# Patient Record
Sex: MALE | Race: WHITE | NOT HISPANIC OR LATINO | Employment: FULL TIME | URBAN - METROPOLITAN AREA
[De-identification: names, ages, dates, MRNs, and addresses within clinical notes are randomized per-mention and may not be internally consistent; named-entity substitution may affect disease eponyms.]

---

## 2018-04-11 ENCOUNTER — APPOINTMENT (EMERGENCY)
Dept: RADIOLOGY | Facility: HOSPITAL | Age: 48
End: 2018-04-11
Attending: EMERGENCY MEDICINE
Payer: COMMERCIAL

## 2018-04-11 ENCOUNTER — HOSPITAL ENCOUNTER (OUTPATIENT)
Facility: HOSPITAL | Age: 48
Setting detail: OBSERVATION
Discharge: HOME | End: 2018-04-12
Attending: EMERGENCY MEDICINE | Admitting: HOSPITALIST
Payer: COMMERCIAL

## 2018-04-11 DIAGNOSIS — R55 NEAR SYNCOPE: Primary | ICD-10-CM

## 2018-04-11 DIAGNOSIS — R00.2 PALPITATIONS: ICD-10-CM

## 2018-04-11 PROBLEM — E78.5 HLD (HYPERLIPIDEMIA): Status: ACTIVE | Noted: 2018-04-11

## 2018-04-11 PROBLEM — F17.200 NICOTINE DEPENDENCE: Status: ACTIVE | Noted: 2018-04-11

## 2018-04-11 LAB
ANION GAP SERPL CALC-SCNC: 6 MEQ/L (ref 3–15)
BASOPHILS # BLD: 0.03 K/UL (ref 0.01–0.1)
BASOPHILS NFR BLD: 0.4 %
BUN SERPL-MCNC: 17 MG/DL (ref 8–20)
CALCIUM SERPL-MCNC: 8.6 MG/DL (ref 8.9–10.3)
CHLORIDE SERPL-SCNC: 103 MMOL/L (ref 98–109)
CO2 SERPL-SCNC: 29 MMOL/L (ref 22–32)
CREAT SERPL-MCNC: 0.9 MG/DL (ref 0.8–1.3)
DIFFERENTIAL METHOD BLD: NORMAL
EOSINOPHIL # BLD: 0.04 K/UL (ref 0.04–0.54)
EOSINOPHIL NFR BLD: 0.6 %
ERYTHROCYTE [DISTWIDTH] IN BLOOD BY AUTOMATED COUNT: 12.7 % (ref 11.6–14.4)
GFR SERPL CREATININE-BSD FRML MDRD: >60 ML/MIN/1.73M*2
GLUCOSE SERPL-MCNC: 96 MG/DL (ref 70–99)
HCT VFR BLDCO AUTO: 42.2 % (ref 40–51)
HGB BLD-MCNC: 14.5 G/DL (ref 13.7–17.5)
IMM GRANULOCYTES # BLD AUTO: 0.03 K/UL (ref 0–0.08)
IMM GRANULOCYTES NFR BLD AUTO: 0.4 %
LYMPHOCYTES # BLD: 1.41 K/UL (ref 1.2–3.5)
LYMPHOCYTES NFR BLD: 20.6 %
MAGNESIUM SERPL-MCNC: 2.1 MG/DL (ref 1.8–2.5)
MCH RBC QN AUTO: 29.5 PG (ref 28–33.2)
MCHC RBC AUTO-ENTMCNC: 34.4 G/DL (ref 32.2–36.5)
MCV RBC AUTO: 85.9 FL (ref 83–98)
MONOCYTES # BLD: 0.46 K/UL (ref 0.3–1)
MONOCYTES NFR BLD: 6.7 %
NEUTROPHILS # BLD: 4.88 K/UL (ref 1.7–7)
NEUTS SEG NFR BLD: 71.3 %
NRBC BLD-RTO: 0 %
PDW BLD AUTO: 10 FL (ref 9.4–12.4)
PLATELET # BLD AUTO: 200 K/UL (ref 150–350)
POTASSIUM SERPL-SCNC: 4 MMOL/L (ref 3.6–5.1)
RBC # BLD AUTO: 4.91 M/UL (ref 4.5–5.8)
SODIUM SERPL-SCNC: 138 MMOL/L (ref 136–144)
T4 FREE SERPL-MCNC: 0.88 NG/DL (ref 0.58–1.64)
TROPONIN I SERPL-MCNC: <0.02 NG/ML
TSH SERPL DL<=0.05 MIU/L-ACNC: 1.47 UIU/ML (ref 0.34–5.6)
WBC # BLD AUTO: 6.85 K/UL (ref 3.8–10.5)

## 2018-04-11 PROCEDURE — 25800000 HC PHARMACY IV SOLUTIONS: Performed by: PHYSICIAN ASSISTANT

## 2018-04-11 PROCEDURE — 84484 ASSAY OF TROPONIN QUANT: CPT | Performed by: PHYSICIAN ASSISTANT

## 2018-04-11 PROCEDURE — 36415 COLL VENOUS BLD VENIPUNCTURE: CPT | Performed by: PHYSICIAN ASSISTANT

## 2018-04-11 PROCEDURE — G0378 HOSPITAL OBSERVATION PER HR: HCPCS

## 2018-04-11 PROCEDURE — 83735 ASSAY OF MAGNESIUM: CPT | Performed by: HOSPITALIST

## 2018-04-11 PROCEDURE — 84443 ASSAY THYROID STIM HORMONE: CPT | Performed by: HOSPITALIST

## 2018-04-11 PROCEDURE — 85025 COMPLETE CBC W/AUTO DIFF WBC: CPT | Performed by: PHYSICIAN ASSISTANT

## 2018-04-11 PROCEDURE — 93005 ELECTROCARDIOGRAM TRACING: CPT | Performed by: PHYSICIAN ASSISTANT

## 2018-04-11 PROCEDURE — 3E0337Z INTRODUCTION OF ELECTROLYTIC AND WATER BALANCE SUBSTANCE INTO PERIPHERAL VEIN, PERCUTANEOUS APPROACH: ICD-10-PCS | Performed by: EMERGENCY MEDICINE

## 2018-04-11 PROCEDURE — 84484 ASSAY OF TROPONIN QUANT: CPT | Performed by: HOSPITALIST

## 2018-04-11 PROCEDURE — 99220 PR INITIAL OBSERVATION CARE/DAY 70 MINUTES: CPT | Performed by: HOSPITALIST

## 2018-04-11 PROCEDURE — 84439 ASSAY OF FREE THYROXINE: CPT | Performed by: HOSPITALIST

## 2018-04-11 PROCEDURE — 80048 BASIC METABOLIC PNL TOTAL CA: CPT | Performed by: PHYSICIAN ASSISTANT

## 2018-04-11 PROCEDURE — 99285 EMERGENCY DEPT VISIT HI MDM: CPT | Mod: 25

## 2018-04-11 PROCEDURE — 96361 HYDRATE IV INFUSION ADD-ON: CPT

## 2018-04-11 PROCEDURE — 71046 X-RAY EXAM CHEST 2 VIEWS: CPT

## 2018-04-11 PROCEDURE — 96360 HYDRATION IV INFUSION INIT: CPT

## 2018-04-11 RX ORDER — ACETAMINOPHEN 325 MG/1
650 TABLET ORAL EVERY 4 HOURS PRN
Status: DISCONTINUED | OUTPATIENT
Start: 2018-04-11 | End: 2018-04-12 | Stop reason: HOSPADM

## 2018-04-11 RX ORDER — DEXTROSE 40 %
15-30 GEL (GRAM) ORAL AS NEEDED
Status: DISCONTINUED | OUTPATIENT
Start: 2018-04-11 | End: 2018-04-12 | Stop reason: HOSPADM

## 2018-04-11 RX ORDER — DEXTROSE 50 % IN WATER (D50W) INTRAVENOUS SYRINGE
25 AS NEEDED
Status: DISCONTINUED | OUTPATIENT
Start: 2018-04-11 | End: 2018-04-12 | Stop reason: HOSPADM

## 2018-04-11 RX ORDER — IBUPROFEN 200 MG
16-32 TABLET ORAL AS NEEDED
Status: DISCONTINUED | OUTPATIENT
Start: 2018-04-11 | End: 2018-04-12 | Stop reason: HOSPADM

## 2018-04-11 RX ORDER — CHOLECALCIFEROL (VITAMIN D3) 25 MCG
1000 TABLET ORAL DAILY
COMMUNITY

## 2018-04-11 RX ORDER — NITROGLYCERIN 0.4 MG/1
0.4 TABLET SUBLINGUAL EVERY 5 MIN PRN
Status: DISCONTINUED | OUTPATIENT
Start: 2018-04-11 | End: 2018-04-12 | Stop reason: HOSPADM

## 2018-04-11 RX ADMIN — SODIUM CHLORIDE 1000 ML: 9 INJECTION, SOLUTION INTRAVENOUS at 17:57

## 2018-04-11 ASSESSMENT — ENCOUNTER SYMPTOMS
ACTIVITY CHANGE: 0
COUGH: 0
DIZZINESS: 0
NUMBNESS: 0
VOMITING: 0
BACK PAIN: 0
APPETITE CHANGE: 0
SHORTNESS OF BREATH: 0
ABDOMINAL DISTENTION: 0
NAUSEA: 0
ABDOMINAL PAIN: 0
CONFUSION: 0
CHEST TIGHTNESS: 0

## 2018-04-11 ASSESSMENT — COGNITIVE AND FUNCTIONAL STATUS - GENERAL
STANDING UP FROM CHAIR USING ARMS: 4 - NONE
TOILETING: 4 - NONE
DRESSING REGULAR UPPER BODY CLOTHING: 4 - NONE
DRESSING REGULAR LOWER BODY CLOTHING: 4 - NONE
WALKING IN HOSPITAL ROOM: 4 - NONE
HELP NEEDED FOR PERSONAL GROOMING: 4 - NONE
HELP NEEDED FOR BATHING: 4 - NONE
CLIMB 3 TO 5 STEPS WITH RAILING: 4 - NONE
MOVING TO AND FROM BED TO CHAIR: 4 - NONE

## 2018-04-11 NOTE — ED ATTESTATION NOTE
I have personally seen and examined the patient.  I reviewed and agree with physician assistant / nurse practitioner’s assessment and plan of care, with the following exceptions: None  My examination, assessment, and plan of care of Arnulfo Alcaraz is as follows:    48yoM w/ Hl, not on medication, smoker, with fhx of sudden unexplained death in his brother at age 41, here following episode of heart palpitations, SOB after sudden waking during quick dizzy spell.  Exam: Lungs CTAB, no W/R/C  Plan: R/o ACS, check trop, r/o arrhythmia, cont cardiac monitoring. Given family hx of event just PTA, will place in obs for serial trop, cards eval, possible ECHO.       Gertrudis Banda MD  04/11/18 6682

## 2018-04-11 NOTE — ED PROVIDER NOTES
HPI     Chief Complaint   Patient presents with   • Rapid Heart Rate   • Dizziness       Patient reports about 4:00 he laid down to try to go to sleep when he is drifting off he felt like he shifted and was falling in his bed he stood up and felt his heart pounding and his legs felt weak.  Patient reports he sat back down in the bed and then got up and walked to a sitting room.  he reports he was still having a fast pounding heartbeat his wife knows he looked pale and took his blood pressure was in the 140 systolic with a pulse in the 70s or 80s.  Patient reports the episode lasted a few seconds and resolved his concern he came to the emergency room for evaluation.    Patient denies any chest pain or pressure, he denies any headache or blurred or double vision.  Patient denies any ripping or tearing pain in his chest belly or back.  Patient denies any recent travel he has had no calf pain or swelling;     She reports his brother  at age 41 sudden death they suspect cardiac    Patient had a vasovagal syncopal episode when he is at the eye doctor and they were talking about floaters per his wife who is a physician.             Patient History     History reviewed. No pertinent past medical history.    History reviewed. No pertinent surgical history.    History reviewed. No pertinent family history.    Social History   Substance Use Topics   • Smoking status: Current Every Day Smoker     Packs/day: 1.00   • Smokeless tobacco: Never Used      Comment: trying to quit starting last night   • Alcohol use 0.6 oz/week     1 Cans of beer per week       Systems Reviewed from Nursing Triage:          Review of Systems     Review of Systems   Constitutional: Negative for activity change and appetite change.   Respiratory: Negative for cough, chest tightness and shortness of breath.    Cardiovascular: Negative for chest pain and leg swelling.   Gastrointestinal: Negative for abdominal distention, abdominal pain, nausea and  "vomiting.   Musculoskeletal: Negative for back pain.   Neurological: Negative for dizziness and numbness.   Psychiatric/Behavioral: Negative for confusion.        Physical Exam     ED Triage Vitals [04/11/18 1725]   Temp Heart Rate Resp BP SpO2   36.9 °C (98.5 °F) 78 18 120/81 98 %      Temp Source Heart Rate Source Patient Position BP Location FiO2 (%) (Set)   Tympanic -- Lying Right upper arm --                     Patient Vitals for the past 24 hrs:   BP Temp Temp src Pulse Resp SpO2 Height Weight   04/11/18 1800 - - - 78 (!) 41 99 % - -   04/11/18 1744 120/79 - - - - - - -   04/11/18 1725 120/81 36.9 °C (98.5 °F) Tympanic 78 18 98 % 1.854 m (6' 1\") 83 kg (183 lb)           Physical Exam   Constitutional: He is oriented to person, place, and time. He appears well-developed.   HENT:   Head: Normocephalic and atraumatic.   Eyes: EOM are normal. Pupils are equal, round, and reactive to light.   Neck: Neck supple.   No carotid bruits   Cardiovascular: Normal rate, regular rhythm and normal heart sounds.    Pulmonary/Chest: Effort normal and breath sounds normal.   Abdominal: Soft. Bowel sounds are normal.   Musculoskeletal: Normal range of motion.   Neurological: He is alert and oriented to person, place, and time. No cranial nerve deficit or sensory deficit. He exhibits normal muscle tone. Coordination normal.   Skin: Skin is warm.   Psychiatric: He has a normal mood and affect.   Nursing note and vitals reviewed.           Procedures    ED Course & MDM     Labs Reviewed   CBC - Normal       Result Value    WBC 6.85      RBC 4.91      Hemoglobin 14.5      Hematocrit 42.2      MCV 85.9      MCH 29.5      MCHC 34.4      RDW 12.7      Platelets 200      MPV 10.0     CBC AND DIFFERENTIAL    Narrative:     The following orders were created for panel order CBC and Differential.  Procedure                               Abnormality         Status                     ---------                               -----------        "  ------                     CBC[[id number]]                           Normal              Final result               Diff Count[[id number]]                                        Final result                 Please view results for these tests on the individual orders.   DIFF COUNT    Differential Type Auto      nRBC 0.0      Immature Granulocytes 0.4      Neutrophils 71.3      Lymphocytes 20.6      Monocytes 6.7      Eosinophils 0.6      Basophils 0.4      Immature Granulocytes, Absolute 0.03      Neutrophils, Absolute 4.88      Lymphocytes, Absolute 1.41      Monocytes, Absolute 0.46      Eosinophils, Absolute 0.04      Basophils, Absolute 0.03     BASIC METABOLIC PANEL   TROPONIN I       ECG 12 lead   ED Interpretation   EKG demonstrates sinus rhythm 73 bpm reviewed by attending      X-RAY CHEST 2 VIEWS    (Results Pending)           [facility]         ED Course as of Apr 12 1054   Wed Apr 11, 2018 1958 X-RAY CHEST 2 VIEWS [JR]      ED Course User Index  [JR] SHER Aranda         Clinical Impressions as of Apr 12 1054   Near syncope   Palpitations     Disposition:       SHER Aranda  04/12/18 1055

## 2018-04-12 VITALS
HEIGHT: 73 IN | OXYGEN SATURATION: 97 % | SYSTOLIC BLOOD PRESSURE: 118 MMHG | DIASTOLIC BLOOD PRESSURE: 72 MMHG | HEART RATE: 69 BPM | BODY MASS INDEX: 24.25 KG/M2 | TEMPERATURE: 97.7 F | RESPIRATION RATE: 18 BRPM | WEIGHT: 183 LBS

## 2018-04-12 PROBLEM — R42 DIZZINESS: Status: ACTIVE | Noted: 2018-04-11

## 2018-04-12 PROBLEM — E78.9 LIPID DISORDER: Status: ACTIVE | Noted: 2018-04-12

## 2018-04-12 LAB
ANION GAP SERPL CALC-SCNC: 4 MEQ/L (ref 3–15)
BUN SERPL-MCNC: 12 MG/DL (ref 8–20)
CALCIUM SERPL-MCNC: 8.6 MG/DL (ref 8.9–10.3)
CHLORIDE SERPL-SCNC: 107 MMOL/L (ref 98–109)
CO2 SERPL-SCNC: 28 MMOL/L (ref 22–32)
CREAT SERPL-MCNC: 0.7 MG/DL (ref 0.8–1.3)
GFR SERPL CREATININE-BSD FRML MDRD: >60 ML/MIN/1.73M*2
GLUCOSE SERPL-MCNC: 93 MG/DL (ref 70–99)
MAGNESIUM SERPL-MCNC: 2 MG/DL (ref 1.8–2.5)
POTASSIUM SERPL-SCNC: 4.1 MMOL/L (ref 3.6–5.1)
SODIUM SERPL-SCNC: 139 MMOL/L (ref 136–144)
TROPONIN I SERPL-MCNC: <0.02 NG/ML

## 2018-04-12 PROCEDURE — 80048 BASIC METABOLIC PNL TOTAL CA: CPT | Performed by: HOSPITALIST

## 2018-04-12 PROCEDURE — 83735 ASSAY OF MAGNESIUM: CPT | Performed by: HOSPITALIST

## 2018-04-12 PROCEDURE — 99217 PR OBSERVATION CARE DISCHARGE MANAGEMENT: CPT | Performed by: HOSPITALIST

## 2018-04-12 PROCEDURE — 36415 COLL VENOUS BLD VENIPUNCTURE: CPT | Performed by: HOSPITALIST

## 2018-04-12 PROCEDURE — G0378 HOSPITAL OBSERVATION PER HR: HCPCS

## 2018-04-12 NOTE — ASSESSMENT & PLAN NOTE
A: Patient presents with near syncope and sudden onset of palpitations that resolved after several seconds. Started a new type of E-cigarette today that has 2.4% nicotine in  It. Family history exists of his brother who  of sudden cardiac death at the age of 40 y/o. Troponin negative and EKG is NSR with no acute ST changes.  Sx resolved  PLAN  ? If related to the high amount of nicotine in the E-cigarette  Avoid E cigarettes.

## 2018-04-12 NOTE — SUBJECTIVE & OBJECTIVE
Admitting diagnosis: Near syncope [R55]  Patient is a 48 y.o. male with a past medical history of hyperlipidemia who presents with complaint of sudden onset of palpitations that occurred around 4 PM and woke him up from a near sleep.  Patient states that he could feel his heart racing and it lasted several seconds.  He attempted to get up from the bed and the palpitations continued.  He states that when he tried to stand he had some lightheadedness and dizziness however denies any chest pain, shortness of breath, nausea, vomiting or vision changes.  He does state that he got some cold and clammy hands and felt some paresthesias of left hand for a few seconds.  Patient states that after the episode resolved he did not have any further episodes of the palpitations. He does state that he was anxious after the palpitations as he never had any similar occurrence. He is trying to quit smoking and started to use a new brand of E-cigarette today that has 2.4% nicotine in it and he is not sure if he has used that strength in the past. Patient states that he does drink some caffeine: 1 [place] coffee and 2 cups of tea per day. He denies any intake of new medications.     Of note, the patient does have a family history of sudden cardiac death of his brother at the age of 41.     Medical History:   Past Medical History:   Diagnosis Date   • Lipid disorder     Diet controlled       Surgical History: History reviewed. No pertinent surgical history.    Social History:   Social History     Social History   • Marital status:      Spouse name: N/A   • Number of children: N/A   • Years of education: N/A     Social History Main Topics   • Smoking status: Current Every Day Smoker     Packs/day: 1.00     Years: 20.00   • Smokeless tobacco: Never Used      Comment: trying to quit starting last night   • Alcohol use 0.6 oz/week     1 Cans of beer per week      Comment: Occasional   • Drug use: No   • Sexual activity: Yes     Other  Topics Concern   • None     Social History Narrative    Patient is originally from [place].       Family History:   Family History   Problem Relation Age of Onset   • Heart disease Mother    • Heart disease Brother    • Sudden death Brother        Allergies: Patient has no known allergies.    Review of Systems  Constitutional: negative for generalized weakness, night sweats, malaise, fevers, fatigue and chills  Eyes: negative for blurred vision and visual disturbance  Ears, nose, mouth, throat, and face: negative for ear pain, ringing in the ears, nasal congestion and hoarseness  Respiratory: negative for cough, shortness of breath, dyspnea on exertion, chest pain/tightness and wheezing  Cardiovascular: positive for lightheadedness, irregular heart beat, palpitations and near syncope, negative for chest pain, chest pressure/discomfort, fatigue, orthopnea, PND and edema lower extremity  Gastrointestinal: negative for nausea, vomiting, constipation and diarrhea  Genitourinary:negative for painful urination, frequency and nocturia  Musculoskeletal:negative for generalized joint pain and muscle weakness  Neurological: positive for lightheadedness and tingling, negative for confusion, headaches, seizures, speech problems, tremors, weakness and diploplia  Behavioral/Psych: negative for visual hallucination, auditory hallucination and depression positive for anxiety    Vital Signs for the last 24 hours:  Temp:  [36.9 °C (98.5 °F)] 36.9 °C (98.5 °F)  Heart Rate:  [67-78] 67  Resp:  [18-41] 20  BP: (120)/(79-81) 120/79    General appearance: alert, appears stated age and cooperative  Head: normocephalic, without obvious abnormality, atraumatic  Eyes: conjunctivae clear. PERRL, EOM's intact.  Ears: normal TM exam and normal external ear  Nose: Nares normal. Septum midline. Mucosa normal.  Throat: normal oropharynx  Neck: no JVD, no adenopathy, no carotid bruit, supple, symmetrical, trachea midline and thyroid not enlarged,  symmetric, no tenderness/mass/nodules  Back: symmetric, no curvature. ROM normal  Lungs: clear to auscultation bilaterally  Chest wall: no tenderness  Heart: regular rate and rhythm, S1, S2 normal, no murmur, click, rub or gallop  Abdomen: soft, non-tender; bowel sounds normal; no masses, no organomegaly  Extremities: extremities normal, warm and well-perfused; no cyanosis, clubbing, or edema  Neurologic: Grossly normal    Labs  I have reviewed the patient's pertinent labs. Pertinent labs are within normal limits.    Imaging  I have independently reviewed the pertinent imaging from the last 24 hrs.  Chest Xray- no acute disease    ECG/Telemetry  I have independently reviewed the ECG. No significant findings., HR 73 bpm, NSR, no ST changes    VTE Assessment: [place] VTE Score: 0

## 2018-04-12 NOTE — ASSESSMENT & PLAN NOTE
A: Patient presents with near syncope and sudden onset of palpitations that resolved after several seconds. Started a new type of E-cigarette today that has 2.4% nicotine in  It. Family history exists of his brother who  of sudden cardiac death at the age of 42 y/o. Troponin negative and EKG is NSR with no acute ST changes.  P:  Will monitor patient closely on telemetry for any arrhythmia.   Keep K>4 and Mg>2.  Check TSH and free T4.   Trend troponins.  Check orthostatic vitals to ensure not related to orthostasis.   Cardiology consult for further recommendation. May need to consider a Holter monitor as outpatient.

## 2018-04-12 NOTE — ASSESSMENT & PLAN NOTE
A: Patient presents with near syncope and sudden onset of palpitations that resolved after several seconds. Started a new type of E-cigarette today that has 2.4% nicotine in  It. Family history exists of his brother who  of sudden cardiac death at the age of 42 y/o. Troponin negative and EKG is NSR with no acute ST changes.  P:  Will monitor patient closely on telemetry for any arrhythmia.   Keep K>4 and Mg>2.  Check TSH and free T4.   Trend troponins.  Check orthostatic vitals to ensure not related to orthostasis.   Cardiology consult for further recommendation. May need to consider a Holter monitor as outpatient.   ? If related to the high amount of nicotine in the E-cigarette.

## 2018-04-12 NOTE — ASSESSMENT & PLAN NOTE
Patient reports a brief episode of lightheadedness, most likely vagal response to palpitations and anxiety.  His symptoms have since resolved and does not have any history of dizziness.  He does have a history of vertigo which is not similar to this episode yesterday.  Patient was had negative orthostatic vital signs.  We do not feel patient needs echocardiogram however if symptoms persist or reoccur we will arrange an outpatient echocardiogram in our office.

## 2018-04-12 NOTE — ASSESSMENT & PLAN NOTE
A: Attempting cessation of cigarettes. Used an E-cigarette today,  P:  Nicotine patch as needed daily.

## 2018-04-12 NOTE — CONSULTS
Cardiology Consult Note  Subjective     Arnulfo Alcaraz is a 48 y.o. male who was admitted for Palpitations [R00.2]  Near syncope [R55]. Arnulfo Alcaraz was referred by Dr. Olguin for management recommendations. Arnulfo Alcaraz is a 48-year-old male, with remarkable past medical history of hyperlipidemia, hx vertigo with seasonal changes, who presents with complaints of brief episode of palpitation and dizziness yesterday.  Patient woke up yesterday in good health and decided to stop smoking cigarettes.  He purchased a e-cigarette from a local [employer] station.   Approximately yesterday around 3 Pm, he smoked his e-cigarette and shortly after had a brief episode of palpitations that lasted for a few seconds.  He reports that he also had brief episode of lightheadedness upon standing.  He let his wife know, a former physician in her country, who notes he appeared pale.  Pt was brought to the Ed, and has had no reoccuring sx.  No arrythmias noted on telemetry.        Past Medical History:   Diagnosis Date   • Lipid disorder     Diet controlled       History reviewed. No pertinent surgical history.    Social History     Social History Narrative    Patient is originally from [place].       Family History   Problem Relation Age of Onset   • Heart disease Mother    • Heart disease Brother    • Sudden death Brother        Patient has no known allergies.    Current Facility-Administered Medications   Medication Dose Route Frequency Provider Last Rate Last Dose   • acetaminophen (TYLENOL) tablet 650 mg  650 mg oral q4h PRLUZ Hale MD       • glucose chewable tablet 16-32 g of dextrose  16-32 g of dextrose oral PRN Fanny Hale MD        Or   • dextrose 40 % oral gel 15-30 g of dextrose  15-30 g of dextrose oral PRN Fanny Hale MD        Or   • glucagon (GLUCAGEN) injection 1 mg  1 mg intramuscular PRN Fanny Hale MD        Or   • dextrose in water injection 12.5 g  25 mL intravenous PRN Fanny Hale MD       • nitroglycerin (NITROSTAT) SL  "tablet 0.4 mg  0.4 mg sublingual q5 min PRN Fanny Hale MD           Review of Systems  Pertinent items are noted in HPI.    Objective     Physical Exam  /68 (BP Location: Right upper arm, Patient Position: Lying)   Pulse 76   Temp 36.4 °C (97.6 °F) (Oral)   Resp 18   Ht 1.854 m (6' 1\")   Wt 83 kg (183 lb)   SpO2 97%   BMI 24.14 kg/m²     The patient appears comfortable and is in no apparent distress,   Eyes: Eyelids and sclera normal,  Neck: No jugular venous distention, no thyromegaly, no lymphadenopathy, no carotid bruits,   Lungs: Clear to auscultation, normal respiratory effort,  Heart: Regular rhythm, normal S1 and S2, no murmurs rubs or gallops,  Abdomen: Soft, nontender,   Extremities: No edema, no calf tenderness or swelling, pulses intact,  Skin: No rashes,  Neuro: Alert and oriented without focal deficit,  Psych: Affect normal.          Labs   Lab Results   Component Value Date    WBC 6.85 04/11/2018    HGB 14.5 04/11/2018    HCT 42.2 04/11/2018     04/11/2018     04/12/2018    K 4.1 04/12/2018     04/12/2018    CREATININE 0.7 (L) 04/12/2018    BUN 12 04/12/2018    CO2 28 04/12/2018    TSH 1.47 04/11/2018       Imaging  I have independently reviewed the pertinent imaging from the last 24 hrs.    Cxr:      The lungs are clear.  No pleural effusion is seen.  There is no pneumothorax.  Cardiomediastinal and hilar contours are within normal limits.  No acute  thoracic bony abnormality is appreciated.   Impression:     IMPRESSION: No acute disease         ECG   sinus rhythm at 73bpm, no acute ST wave abnormalities.    Telemetry  sinus rhythm      Assessment   48 y.o. male being consulted for management recommendations  Dizziness   Assessment & Plan    Patient reports a brief episode of lightheadedness, most likely vagal response to palpitations and anxiety.  His symptoms have since resolved and does not have any history of dizziness.  He does have a history of vertigo which is " not similar to this episode yesterday.  Patient was had negative orthostatic vital signs.  We do not feel patient needs echocardiogram however if symptoms persist or reoccur we will arrange an outpatient echocardiogram in our office.        * Palpitations   Assessment & Plan    Patient had a brief episode of palpitations after trying the e-cigarette yesterday.  It lasted only seconds.  He has had no recurring symptoms, no arrhythmias noted on telemetry monitoring.  Most likely this is a response from the E cigarette which he was advised to avoid.  He is safe for discharge from the hospital from a cardiac standpoint.  If symptoms reoccur we will consider outpatient heart monitoring.          Lipid disorder   Assessment & Plan    Cont diet/exercise, followed by PCP.        Nicotine dependence   Assessment & Plan    Avoid E cigarettes, consider nicorette gum/patch.                    SHER Hess  4/12/2018

## 2018-04-12 NOTE — DISCHARGE SUMMARY
Inpatient Discharge Summary    BRIEF OVERVIEW  Admitting Provider:  H&P Notes 3/13/2018 to 4/12/2018     Date of Service Author Author Type Status Note Type File Time    04/11/18 2116 Fanny Hale MD Physician Signed H&P 04/11/18 2116          Attending Provider: Bonita Olguin MD Attending phys phone: [phone]  Primary Care Physician at Discharge: Corie Kidd [phone]    Admission Date: 4/11/2018     Discharge Date: 4/12/2018    Primary Discharge Diagnosis  Palpitations    Secondary Discharge Diagnosis  Active Hospital Problems    Diagnosis Date Noted   • Lipid disorder 04/12/2018   • Dizziness 04/11/2018   • Palpitations 04/11/2018   • HLD (hyperlipidemia) 04/11/2018   • Nicotine dependence 04/11/2018      Resolved Hospital Problems    Diagnosis Date Noted Date Resolved   No resolved problems to display.       DETAILS OF HOSPITAL STAY    Operative Procedures Performed      Consults:   Consult Notes 3/13/2018 to 4/12/2018     Date of Service Author Author Type Status Note Type File Time    04/12/18 1100 SHER Cabrera Physician Assistant Attested Consults 04/12/18 1208          Consult Orders During Admission:  IP CONSULT TO CARDIOLOGY     Procedures: none  Pertinent Test Results: telemetry without significant abnormality    Imaging  X-ray Chest 2 Views    Result Date: 4/11/2018  IMPRESSION: No acute disease          Presenting Problem/History of Present Illness  Near syncope         Exam on Day of Discharge  Patient seen and examined on day of discharge.  General:  Patient is awake, alert, was seen with wife at bedside  EXAM:  Chest   Clear       Heart RRR without murmurs  Abd soft, Nt, Bs+  Ext  No c/c/e  Neuro:  A&O x 3, calm, appropriate  Ext no edema    Hospital Course  Patient was admitted with palpitations after  Experiencing prolonged period of palpitations after using an e-cigarette yesterday (in effort to stop smoking).    He reports chest sensations were very uncomfortable, and so he  came to Ed.  The palpitations have resolved and he has no further symptoms.  He reports multiple episodes of attempting to stop smoking.    He was seen in consultation by Cardiology who recommended no further evaluation at present time.  He was given a card by Dr. Mendelson, with instructions to f/u as needed.  Discussed options for managing smoking cessation, and managing anxiety (for which he has taken lorazepam in past prn--he was not given an rx at present time).  He is stable for DC    Discharge Orders  Released Discharge Orders     Order Details Provider Status    cholecalciferol, vitamin D3, 1,000 unit tablet Take 1,000 Units by mouth daily. Bonita Olguin MD Resume at Discharge (Patient Reported)    acetaminophen (TYLENOL) tablet 650 mg 650 mg, oral, Every 4 hours PRN, pain, fever, oral temp > 100.4°F or rectal temp > 101.4°F, Starting Wed 4/11/18 at 2207, For 90 daysMay be given with NSAIDs/opioids.  Max acetaminophen 4000 mg/day Bonita Olguin MD Do Not Order at Discharge    dextrose 40 % oral gel 15-30 g of dextrose 15-30 g of dextrose, oral, As needed, low blood sugar, Blood Glucose <= 70, Starting Wed 4/11/18 at 2206, For 90 days* If unable to chew but able to take PO * Hypoglycemia (Adult)  Blood Glucose 51 mg/dL - 70 mg/dL -- Administer 15 grams of simple carbohydrates (1 tube of glucose gel) Blood Glucose <= 50 mg/dL -- Administer 30 grams of simple carbohydrates (2 tubes of glucose gel) Bonita Olguin MD Do Not Order at Discharge    dextrose in water injection 12.5 g 12.5 g (25 mL), intravenous, As needed, low blood sugar, Blood Glucose <= 70, Starting Wed 4/11/18 at 2206, For 90 days* If NPO or unable to swallow and has IV access * Bonita Olguin MD Do Not Order at Discharge    glucagon (GLUCAGEN) injection 1 mg 1 mg, intramuscular, As needed, low blood sugar, Blood Glucose <= 70, Starting Wed 4/11/18 at 2206, For 90 days* If NPO or unable to swallow and does not have IV access * Bonita MOON  MD Sharif Do Not Order at Discharge    glucose chewable tablet 16-32 g of dextrose 16-32 g of dextrose, oral, As needed, low blood sugar, Blood Glucose <= 70, Starting Wed 4/11/18 at 2206, For 90 days* If able to take PO * Hypoglycemia (Adult)  Blood Glucose 51 mg/dL - 70 mg/dL -- Administer 16 grams of simple carbohydrates (4 glucose tablets) Blood Glucose <= 50 mg/dL -- Administer 32 grams of simple carbohydrates (8 glucose tablets) Bonita Olguin MD Do Not Order at Discharge    nitroglycerin (NITROSTAT) SL tablet 0.4 mg 0.4 mg, sublingual, Every 5 min PRN, chest pain, for 3 doses, Starting Wed 4/11/18 at 2207, For 90 daysq 5 min x 3 doses PRN chest pain. STAT ECG prior to administration Please verify the patient has not taken sildenafil (VIAGRA, REVATIO); tadalafil (CIALIS, ADCIRCA); vardenafil (LEVITRA, STAXYN) or avanafil (STENDRA) in the past 48 hours before administration. Bonita Olguin MD Do Not Order at Discharge          Outpatient Follow-Ups  No future appointments.  Referrals:  No orders of the defined types were placed in this encounter.      Active Issues Requiring Follow-up  Issue: smoking cessation  What is Needed:  Stop E-cigarettes, try lower dose nicotine patch (14 mg may be low enough to avoid palpitations, and if palpitations occur, stop)      Test Results Pending at Discharge  Unresulted Labs     None              Discharge Disposition  Home   Code Status at Discharge: Full Code  Physician Order for Life-Sustaining Treatment Document Status      No documents found

## 2018-04-12 NOTE — H&P
History & Physical    Subjective/Objective:  Admitting diagnosis: Near syncope [R55]  Patient is a 48 y.o. male with a past medical history of hyperlipidemia who presents with complaint of sudden onset of palpitations that occurred around 4 PM and woke him up from a near sleep.  Patient states that he could feel his heart racing and it lasted several seconds.  He attempted to get up from the bed and the palpitations continued.  He states that when he tried to stand he had some lightheadedness and dizziness however denies any chest pain, shortness of breath, nausea, vomiting or vision changes.  He does state that he got some cold and clammy hands and felt some paresthesias of left hand for a few seconds.  Patient states that after the episode resolved he did not have any further episodes of the palpitations. He does state that he was anxious after the palpitations as he never had any similar occurrence. He is trying to quit smoking and started to use a new brand of E-cigarette today that has 2.4% nicotine in it and he is not sure if he has used that strength in the past. Patient states that he does drink some caffeine: 1 [place] coffee and 2 cups of tea per day. He denies any intake of new medications.     Of note, the patient does have a family history of sudden cardiac death of his brother at the age of 41.     Medical History:   Past Medical History:   Diagnosis Date   • Lipid disorder     Diet controlled       Surgical History: History reviewed. No pertinent surgical history.    Social History:   Social History     Social History   • Marital status:      Spouse name: N/A   • Number of children: N/A   • Years of education: N/A     Social History Main Topics   • Smoking status: Current Every Day Smoker     Packs/day: 1.00     Years: 20.00   • Smokeless tobacco: Never Used      Comment: trying to quit starting last night   • Alcohol use 0.6 oz/week     1 Cans of beer per week      Comment: Occasional   • Drug  use: No   • Sexual activity: Yes     Other Topics Concern   • None     Social History Narrative    Patient is originally from [place].       Family History:   Family History   Problem Relation Age of Onset   • Heart disease Mother    • Heart disease Brother    • Sudden death Brother        Allergies: Patient has no known allergies.    Review of Systems  Constitutional: negative for generalized weakness, night sweats, malaise, fevers, fatigue and chills  Eyes: negative for blurred vision and visual disturbance  Ears, nose, mouth, throat, and face: negative for ear pain, ringing in the ears, nasal congestion and hoarseness  Respiratory: negative for cough, shortness of breath, dyspnea on exertion, chest pain/tightness and wheezing  Cardiovascular: positive for lightheadedness, irregular heart beat, palpitations and near syncope, negative for chest pain, chest pressure/discomfort, fatigue, orthopnea, PND and edema lower extremity  Gastrointestinal: negative for nausea, vomiting, constipation and diarrhea  Genitourinary:negative for painful urination, frequency and nocturia  Musculoskeletal:negative for generalized joint pain and muscle weakness  Neurological: positive for lightheadedness and tingling, negative for confusion, headaches, seizures, speech problems, tremors, weakness and diploplia  Behavioral/Psych: negative for visual hallucination, auditory hallucination and depression positive for anxiety    Vital Signs for the last 24 hours:  Temp:  [36.9 °C (98.5 °F)] 36.9 °C (98.5 °F)  Heart Rate:  [67-78] 67  Resp:  [18-41] 20  BP: (120)/(79-81) 120/79    General appearance: alert, appears stated age and cooperative  Head: normocephalic, without obvious abnormality, atraumatic  Eyes: conjunctivae clear. PERRL, EOM's intact.  Ears: normal TM exam and normal external ear  Nose: Nares normal. Septum midline. Mucosa normal.  Throat: normal oropharynx  Neck: no JVD, no adenopathy, no carotid bruit, supple, symmetrical,  trachea midline and thyroid not enlarged, symmetric, no tenderness/mass/nodules  Back: symmetric, no curvature. ROM normal  Lungs: clear to auscultation bilaterally  Chest wall: no tenderness  Heart: regular rate and rhythm, S1, S2 normal, no murmur, click, rub or gallop  Abdomen: soft, non-tender; bowel sounds normal; no masses, no organomegaly  Extremities: extremities normal, warm and well-perfused; no cyanosis, clubbing, or edema  Neurologic: Grossly normal    Labs  I have reviewed the patient's pertinent labs. Pertinent labs are within normal limits.    Imaging  I have independently reviewed the pertinent imaging from the last 24 hrs.  Chest Xray- no acute disease    ECG/Telemetry  I have independently reviewed the ECG. No significant findings., HR 73 bpm, NSR, no ST changes    VTE Assessment: [place] VTE Score: 0          Assessment/Plan:  * Palpitations   Assessment & Plan    A: Patient presents with near syncope and sudden onset of palpitations that resolved after several seconds. Started a new type of E-cigarette today that has 2.4% nicotine in  It. Family history exists of his brother who  of sudden cardiac death at the age of 42 y/o. Troponin negative and EKG is NSR with no acute ST changes.  P:  Will monitor patient closely on telemetry for any arrhythmia.   Keep K>4 and Mg>2.  Check TSH and free T4.   Trend troponins.  Check orthostatic vitals to ensure not related to orthostasis.   Cardiology consult for further recommendation. May need to consider a Holter monitor as outpatient.         Near syncope   Assessment & Plan    A: Patient presents with near syncope and sudden onset of palpitations that resolved after several seconds. Started a new type of E-cigarette today that has 2.4% nicotine in  It. Family history exists of his brother who  of sudden cardiac death at the age of 42 y/o. Troponin negative and EKG is NSR with no acute ST changes.  P:  Will monitor patient closely on telemetry for any  arrhythmia.   Keep K>4 and Mg>2.  Check TSH and free T4.   Trend troponins.  Check orthostatic vitals to ensure not related to orthostasis.   Cardiology consult for further recommendation. May need to consider a Holter monitor as outpatient.   ? If related to the high amount of nicotine in the E-cigarette.        Nicotine dependence   Assessment & Plan    A: Attempting cessation of cigarettes. Used an E-cigarette today,  P:  Nicotine patch as needed daily.        HLD (hyperlipidemia)   Assessment & Plan    A: Not on any medications as outpatient.  P:  Check lipid panel.  Continue diet control.             Code Status: Full Code  Estimated discharge date: 4/12/2018

## 2018-04-12 NOTE — PLAN OF CARE
Problem: Patient Care Overview  Goal: Plan of Care Review  Outcome: Ongoing (interventions implemented as appropriate)   04/12/18 0833   Coping/Psychosocial   Plan Of Care Reviewed With patient   Plan of Care Review   Progress progress toward functional goals as expected   Outcome Summary Patient verbalized plan of care to see cardiologist and hospitalist today .

## 2018-04-12 NOTE — ASSESSMENT & PLAN NOTE
Patient had a brief episode of palpitations after trying the e-cigarette yesterday.  It lasted only seconds.  He has had no recurring symptoms, no arrhythmias noted on telemetry monitoring.  Most likely this is a response from the E cigarette which he was advised to avoid.  He is safe for discharge from the hospital from a cardiac standpoint.  If symptoms reoccur we will consider outpatient heart monitoring.

## 2018-04-14 LAB
ATRIAL RATE: 73
P AXIS: 57
PR INTERVAL: 126
QRS DURATION: 92
QT INTERVAL: 360
QTC CALCULATION(BAZETT): 396
R AXIS: 76
T WAVE AXIS: 35
VENTRICULAR RATE: 73

## 2022-08-31 ENCOUNTER — APPOINTMENT (RX ONLY)
Dept: URBAN - METROPOLITAN AREA CLINIC 26 | Facility: CLINIC | Age: 52
Setting detail: DERMATOLOGY
End: 2022-08-31

## 2022-08-31 DIAGNOSIS — L82.1 OTHER SEBORRHEIC KERATOSIS: ICD-10-CM

## 2022-08-31 PROBLEM — D48.5 NEOPLASM OF UNCERTAIN BEHAVIOR OF SKIN: Status: ACTIVE | Noted: 2022-08-31

## 2022-08-31 PROCEDURE — ? BIOPSY BY SHAVE METHOD

## 2022-08-31 PROCEDURE — 69100 BIOPSY OF EXTERNAL EAR: CPT

## 2022-08-31 ASSESSMENT — LOCATION DETAILED DESCRIPTION DERM: LOCATION DETAILED: RIGHT CAVUM CONCHA

## 2022-08-31 ASSESSMENT — LOCATION SIMPLE DESCRIPTION DERM: LOCATION SIMPLE: RIGHT EAR

## 2022-08-31 ASSESSMENT — LOCATION ZONE DERM: LOCATION ZONE: EAR

## 2022-08-31 NOTE — PROCEDURE: BIOPSY BY SHAVE METHOD
Detail Level: Detailed
Depth Of Biopsy: dermis
Was A Bandage Applied: Yes
Size Of Lesion In Cm: 0
Biopsy Type: H and E
Biopsy Method: curette
Anesthesia Type: 1% lidocaine with epinephrine and a 1:10 solution of 8.4% sodium bicarbonate
Anesthesia Volume In Cc (Will Not Render If 0): 0.5
Hemostasis: Aluminum Chloride
Wound Care: Petrolatum
Dressing: pressure dressing with telfa
Destruction After The Procedure: No
Type Of Destruction Used: Curettage
Curettage Text: The wound bed was treated with curettage after the biopsy was performed.
Cryotherapy Text: The wound bed was treated with cryotherapy after the biopsy was performed.
Electrodesiccation Text: The wound bed was treated with electrodesiccation after the biopsy was performed.
Electrodesiccation And Curettage Text: The wound bed was treated with electrodesiccation and curettage after the biopsy was performed.
Silver Nitrate Text: The wound bed was treated with silver nitrate after the biopsy was performed.
Lab: -17
Consent: Written consent was obtained and risks were reviewed including but not limited to scarring, infection, bleeding, scabbing, incomplete removal, nerve damage and allergy to anesthesia.
Post-Care Instructions: I reviewed with the patient in detail post-care instructions. Patient is to keep the biopsy site dry overnight, and then apply bacitracin twice daily until healed. Patient may apply hydrogen peroxide soaks to remove any crusting.
Notification Instructions: Patient will be notified of biopsy results. However, patient instructed to call the office if not contacted within 2 weeks.
Billing Type: Third-Party Bill
Information: Selecting Yes will display possible errors in your note based on the variables you have selected. This validation is only offered as a suggestion for you. PLEASE NOTE THAT THE VALIDATION TEXT WILL BE REMOVED WHEN YOU FINALIZE YOUR NOTE. IF YOU WANT TO FAX A PRELIMINARY NOTE YOU WILL NEED TO TOGGLE THIS TO 'NO' IF YOU DO NOT WANT IT IN YOUR FAXED NOTE.

## 2022-08-31 NOTE — HPI: SKIN LESION
What Type Of Note Output Would You Prefer (Optional)?: Standard Output
Is This A New Presentation, Or A Follow-Up?: Skin Lesion
Additional History: Pt noticed it December 2021. Pt states it began as a nodule. Pt removed the crust twice, no fluid or puss came out but it did bleed. Pts wife has been observing the lesion and says it has scaled over multiple times and that it has gotten darker in color.

## 2024-12-16 ENCOUNTER — TELEPHONE (OUTPATIENT)
Dept: SCHEDULING | Facility: CLINIC | Age: 54
End: 2024-12-16
Payer: COMMERCIAL

## 2024-12-16 NOTE — TELEPHONE ENCOUNTER
New Patient Appointment Request    Name of caller: Lukasz Butler    Reason for Visit: Cardiac Check up, High BP and High Cholesterol    Insurance:     Insurance ID #: [id number]     Recent Cardiac Test/Procedures: none    Referred by: mom is a pt of Dr Quintero    Primary Care Physician: Corie Kidd MD      Previous Cardiologist name and phone number: none    Best contact number: [phone]    Pt declined next appt in epic for 02/20/25 for a sooner appt

## 2025-01-23 ENCOUNTER — OFFICE VISIT (OUTPATIENT)
Dept: CARDIOLOGY | Facility: CLINIC | Age: 55
End: 2025-01-23
Payer: COMMERCIAL

## 2025-01-23 VITALS — HEART RATE: 80 BPM | WEIGHT: 191 LBS | BODY MASS INDEX: 25.31 KG/M2 | HEIGHT: 73 IN

## 2025-01-23 DIAGNOSIS — E78.00 PURE HYPERCHOLESTEROLEMIA: ICD-10-CM

## 2025-01-23 DIAGNOSIS — R42 DIZZINESS: Primary | ICD-10-CM

## 2025-01-23 DIAGNOSIS — Z82.49 FAMILY HISTORY OF HEART DISEASE: ICD-10-CM

## 2025-01-23 DIAGNOSIS — K82.4 GALLBLADDER POLYP: ICD-10-CM

## 2025-01-23 DIAGNOSIS — F17.200 NICOTINE DEPENDENCE, UNCOMPLICATED, UNSPECIFIED NICOTINE PRODUCT TYPE: ICD-10-CM

## 2025-01-23 DIAGNOSIS — R03.0 ELEVATED BLOOD PRESSURE READING: ICD-10-CM

## 2025-01-23 LAB
ATRIAL RATE: 80
P AXIS: 62
PR INTERVAL: 118
QRS DURATION: 94
QT INTERVAL: 372
QTC CALCULATION(BAZETT): 429
R AXIS: 76
T WAVE AXIS: 39
VENTRICULAR RATE: 80

## 2025-01-23 PROCEDURE — 99205 OFFICE O/P NEW HI 60 MIN: CPT | Performed by: INTERNAL MEDICINE

## 2025-01-23 PROCEDURE — 3008F BODY MASS INDEX DOCD: CPT | Performed by: INTERNAL MEDICINE

## 2025-01-23 PROCEDURE — 93000 ELECTROCARDIOGRAM COMPLETE: CPT | Performed by: INTERNAL MEDICINE

## 2025-01-23 RX ORDER — EZETIMIBE 10 MG/1
10 TABLET ORAL NIGHTLY
COMMUNITY

## 2025-01-23 RX ORDER — VIT C/E/ZN/COPPR/LUTEIN/ZEAXAN 250MG-90MG
500 CAPSULE ORAL DAILY
COMMUNITY

## 2025-01-23 NOTE — PROGRESS NOTES
CARDIOLOGY OUTPATIENT NOTE      Subjective:  Lukasz(Sa comes in for evaluation because of high cholesterol and intermittent elevation in blood pressure.)  He is under significant amount of stress.  His blood pressure can reach 150/90 at times.  He also has a moderately elevated cholesterol as described above.  He had muscle pain and increase in transaminases with Crestor as well as another statin.  Currently is on Zetia with some improvement.  He has no angina dyspnea dizziness syncope near syncope claudication or symptoms of cerebrovascular disease.  He has smoked up to a pack per day until 6 or 7 years ago.  His mother had occluded the LAD and middle-age resulting in a large infarct.  His father had sudden death after pulmonary congestion at the age of 77.      Allergies: Patient has no known allergies.    Medications    Current Outpatient Medications:     cholecalciferol, vitamin D3, 1,000 unit tablet, Take 1,000 Units by mouth daily., Disp: , Rfl:       History  Medical History:   Past Medical History:   Diagnosis Date    Lipid disorder     Diet controlled       Surgical History: No past surgical history on file.    Social History:   Social History     Socioeconomic History    Marital status:      Spouse name: None    Number of children: None    Years of education: None    Highest education level: None   Tobacco Use    Smoking status: Every Day     Current packs/day: 1.00     Average packs/day: 1 pack/day for 20.0 years (20.0 ttl pk-yrs)     Types: Cigarettes    Smokeless tobacco: Never    Tobacco comments:     trying to quit starting last night   Substance and Sexual Activity    Alcohol use: Yes     Alcohol/week: 1.0 standard drink of alcohol     Types: 1 Cans of beer per week     Comment: Occasional    Drug use: No    Sexual activity: Yes   Social History Narrative    Patient is originally from [place].       Family History:   Family History   Problem Relation Name Age of Onset    Heart disease Biological  Mother      Heart disease Biological Brother      Sudden death Biological Brother       2 children.  No aneurysms or deep vein thrombosis.    Review of Systems   14 point review of systems completed and otherwise negative unless noted above in the HPI      OBJECTIVE  There were no vitals taken for this visit.  Weights (last 7 days)       None              Physical Exam   In no acute distress.  Weight is 191 pounds.  Blood pressure 135/85 both upper extremities sitting.    No icterus.  No conjunctival pallor.  Normal jugular venous pressure.  Normal carotid upstroke without bruits.  No adenopathy.  Quiet precordium.  S1-S2 are normal.  No murmurs or gallops.  Lungs clear.  Abdomen soft no megalies no masses no abnormal pulsations.  No rashes breakdown or malignancy of the skin.  No clubbing cyanosis or edema.  No gross neurologic deficits.    Labs  CBC Results         04/11/18     1758    WBC 6.85    RBC 4.91    HGB 14.5    HCT 42.2    MCV 85.9    MCH 29.5    MCHC 34.4              CMP Results         04/12/18 04/11/18     0419 1758     138    K 4.1 4.0    Cl 107 103    CO2 28 29    Glucose 93 96    BUN 12 17    Creatinine 0.7 0.9    Calcium 8.6 8.6    Anion Gap 4 6    EGFR >60.0 >60.0           Comment for K at 1758 on 04/11/18:   Results obtained on plasma. Plasma Potassium values may be up to 0.4 mEQ/L less than serum values. The differences may be greater for patients with high platelet or white cell counts.          PT PTT Results    No lab values to display.       Troponin I Results         04/11/18 04/11/18     2353 1758    Troponin I <0.02 <0.02        Lipid panel at [facility] 1/30/2023 showed a cholesterol of 186 LDL of 120 and HDL of 36..  12/19/2022 cholesterol was 259 LDL of 176.  9/27/2022 LDL was 124.  Comprehensive metabolic profile on 1/30/2023 was normal.  AST and ALT were however elevated to 66 and 101 respectively.    Including a creatinine of 0.87  Repeat comprehensive metabolic profile  4/5/2023 showed an ALT of 50 and an AST of 33 and a creatinine of 0.8.  Cardiology results  EKG:    Within normal limits.    Echocardiogram [facility] on 10/26/2022 showed a normal left ventricle with an ejection fraction of 60%.  Normal right ventricle.  The test was essentially normal.  Exercise stress test only on 10/26/2022 was also normal to a workload of 8.2 METS and a peak heart rate of 153 bpm which is 90% of maximal predicted heart rate.  Ultrasound April 22 at [facility] showed a gallbladder polyp.  ASSESSMENT AND PLAN:    1.  Hyperlipidemia.  Intolerant to Crestor.  .  Saw Dr. Darby at [facility] on 12/6/2022.  His cholesterol in the past was 167.  Became 124 for a brief amount of time that he was on Crestor which then was stopped because of muscle pains.  Currently on ezetimibe 10 mg daily.    2.  Elevated blood pressure at times.    3.  History of smoking stopped 7 years ago.    4.  Gallbladder polyp and an ultrasound in 2022.  They recommended follow-up.    5.  Former smoker.  Stopped 7 years ago.  If clinically indicated consider enrolling him in a screening program for lung cancer.    We discussed the issue.  I would recommend coronary artery calcium scoring given the risk factors and the need for aggressive factors including the injectables in case there is already plaque.  I will order as well a gallbladder ultrasound.  He should check his blood frequently (his wife is a physician) and if it is persistently above 130/80 with risk factor modification he warrants treatment.    We will regroup once the above tests are performed.      Benito Quintero MD  1/23/2025  10:24 AM

## 2025-02-19 ENCOUNTER — TELEPHONE (OUTPATIENT)
Dept: SCHEDULING | Facility: CLINIC | Age: 55
End: 2025-02-19
Payer: COMMERCIAL

## 2025-02-19 NOTE — TELEPHONE ENCOUNTER
Patient was told by central scheduling to call the office to get authorization for this test    Pre-cert Request    Name of patient: Lukasz Butler    Name of physician: Benito Quintero MD    Type of test: CT Heart Calcium Score    Insurance: [facility]/BS    [facility]: [id number]    Scheduled/Expected date for test: 2/26/25    Additional notes:

## 2025-02-19 NOTE — TELEPHONE ENCOUNTER
This pt is not on our actv wq list.     Carola,   Please initiate pre-cert for CT Calcium Score to be done at [facility] NPI# [id number].  Thanks,

## 2025-02-20 NOTE — TELEPHONE ENCOUNTER
Spoke with patient to advise CT was authorized and ready for scheduling. Provided authorization information & pt already had scheduling number

## 2025-02-21 ENCOUNTER — HOSPITAL ENCOUNTER (OUTPATIENT)
Dept: RADIOLOGY | Facility: HOSPITAL | Age: 55
Discharge: HOME | End: 2025-02-21
Attending: INTERNAL MEDICINE

## 2025-02-21 DIAGNOSIS — E78.00 PURE HYPERCHOLESTEROLEMIA: ICD-10-CM

## 2025-02-21 DIAGNOSIS — R03.0 ELEVATED BLOOD PRESSURE READING: ICD-10-CM

## 2025-02-21 DIAGNOSIS — F17.200 NICOTINE DEPENDENCE, UNCOMPLICATED, UNSPECIFIED NICOTINE PRODUCT TYPE: ICD-10-CM

## 2025-02-21 DIAGNOSIS — Z82.49 FAMILY HISTORY OF HEART DISEASE: ICD-10-CM

## 2025-02-21 PROCEDURE — 75571 CT HRT W/O DYE W/CA TEST: CPT

## 2025-02-27 ENCOUNTER — HOSPITAL ENCOUNTER (OUTPATIENT)
Dept: RADIOLOGY | Facility: HOSPITAL | Age: 55
Discharge: HOME | End: 2025-02-27
Attending: INTERNAL MEDICINE
Payer: COMMERCIAL

## 2025-02-27 DIAGNOSIS — K82.4 GALLBLADDER POLYP: ICD-10-CM

## 2025-02-27 PROCEDURE — 76705 ECHO EXAM OF ABDOMEN: CPT

## 2025-03-13 ENCOUNTER — TELEPHONE (OUTPATIENT)
Dept: SCHEDULING | Facility: CLINIC | Age: 55
End: 2025-03-13
Payer: COMMERCIAL

## 2025-03-13 DIAGNOSIS — E78.5 HYPERLIPIDEMIA, UNSPECIFIED HYPERLIPIDEMIA TYPE: Primary | ICD-10-CM

## 2025-03-13 DIAGNOSIS — R93.1 ELEVATED CORONARY ARTERY CALCIUM SCORE: ICD-10-CM

## 2025-03-13 NOTE — TELEPHONE ENCOUNTER
SH- Lipid panel script mailed out to patient and copy of Gallbladder US results faxed to [phone] for Dr. Kidd.     CW- Can you assist with record request noted below in Vicki's absence? TY

## 2025-03-13 NOTE — TELEPHONE ENCOUNTER
Hi, I called the PCP and they faxed over labs from 7/19/2024 and also 11/14/2024 both were scanned in Epic. Give it  few minutes to pop up. Thank you

## 2025-03-13 NOTE — TELEPHONE ENCOUNTER
"Results review notes from  for CT Heart and Gallbladder U/S:    \"Please tell him that the gallbladder polyp is stable in size over the last couple of years.  He should follow it up with his internist.\"    \"Please tell him that there is mild plaque in his coronaries and that I would favor treating the LDL very vigorously.  If Zetia is not controlling the LDL then maybe 1 can try a very small dose of Crestor or Pravachol maybe even every other day and see if we can achieve results.  Injectables are a last resort and they can be pretty expensive but he should consider seeing Dr. Lozano our lipid.\"      I spoke w/ patient.  We discussed 2/27 Gallbladder U/S showed stable polyp and to f/u w/ PCP further.    Advised CT Heart CAC score of 33 in LAD which, is mild plaque/narrowing and puts him in 75th to 90th percentile.    Advised  recommends to treat LDL aggressively.  Confirmed he is compliant w/ Zetia 10 mg 1 tablet daily.  Reports Myalgias to Crestor in past.    Asked if he's had recent Lipids?  Last in CareEverywhere 2023 .  States had Lipids done through PCP 6 months ago but, wishes to recheck presently before re-trying a Statin.    Advised we'll still obtain 2024 Lipids for historical record purposes.    Asked to complete updated FASTING lipids and if LDL is similar to before then we'll try low dose Pravastatin every OTHER day.  If doesn't tolerate will then see [facility].  Patient agreeable to plan.    PSR Team: 1.Please mail a hard copy of Lipid panel to patient  2. Please fax 2/27 Gallbladder U/S results to PCP fax #[phone].    BG: Can you please request Lipid panel results from PCP office, pt thinks had done 9/2024. Please scan into EPIC once received.    Thank you     "

## 2025-03-13 NOTE — TELEPHONE ENCOUNTER
Test Results     Name of caller: Lukasz Daomnhi    Relationship to patient: self    Name of patient: Lukasz Damonhi    Name of physician: Benito Quintero MD    Type of test: CT coronary calcium score    Best contact number: [phone]

## 2025-03-19 LAB
CHOLEST SERPL-MCNC: 214 MG/DL (ref 100–199)
HDLC SERPL-MCNC: 38 MG/DL
LDLC SERPL CALC-MCNC: 137 MG/DL (ref 0–99)
TRIGL SERPL-MCNC: 216 MG/DL (ref 0–149)
VLDLC SERPL CALC-MCNC: 39 MG/DL (ref 5–40)

## 2025-05-28 ENCOUNTER — OFFICE VISIT (OUTPATIENT)
Dept: CARDIOLOGY | Facility: CLINIC | Age: 55
End: 2025-05-28
Payer: COMMERCIAL

## 2025-05-28 VITALS — HEIGHT: 68 IN | WEIGHT: 188 LBS | HEART RATE: 88 BPM | BODY MASS INDEX: 28.49 KG/M2

## 2025-05-28 DIAGNOSIS — R03.0 ELEVATED BLOOD PRESSURE READING: ICD-10-CM

## 2025-05-28 DIAGNOSIS — E78.00 PURE HYPERCHOLESTEROLEMIA: ICD-10-CM

## 2025-05-28 DIAGNOSIS — R42 DIZZINESS: Primary | ICD-10-CM

## 2025-05-28 PROCEDURE — 3008F BODY MASS INDEX DOCD: CPT | Performed by: INTERNAL MEDICINE

## 2025-05-28 PROCEDURE — 99214 OFFICE O/P EST MOD 30 MIN: CPT | Performed by: INTERNAL MEDICINE

## 2025-05-28 RX ORDER — EZETIMIBE 10 MG/1
10 TABLET ORAL NIGHTLY
Qty: 90 TABLET | Refills: 3 | Status: SHIPPED | OUTPATIENT
Start: 2025-05-28